# Patient Record
Sex: MALE | Race: WHITE | NOT HISPANIC OR LATINO | Employment: UNEMPLOYED | URBAN - METROPOLITAN AREA
[De-identification: names, ages, dates, MRNs, and addresses within clinical notes are randomized per-mention and may not be internally consistent; named-entity substitution may affect disease eponyms.]

---

## 2018-09-13 ENCOUNTER — OFFICE VISIT - HEALTHEAST (OUTPATIENT)
Dept: FAMILY MEDICINE | Facility: CLINIC | Age: 43
End: 2018-09-13

## 2018-09-13 ENCOUNTER — COMMUNICATION - HEALTHEAST (OUTPATIENT)
Dept: TELEHEALTH | Facility: CLINIC | Age: 43
End: 2018-09-13

## 2018-09-13 DIAGNOSIS — R10.9 FLANK PAIN: ICD-10-CM

## 2018-09-13 LAB
ALBUMIN UR-MCNC: NEGATIVE MG/DL
APPEARANCE UR: CLEAR
BACTERIA #/AREA URNS HPF: ABNORMAL HPF
BILIRUB UR QL STRIP: NEGATIVE
COLOR UR AUTO: YELLOW
GLUCOSE UR STRIP-MCNC: NEGATIVE MG/DL
HGB UR QL STRIP: NEGATIVE
KETONES UR STRIP-MCNC: NEGATIVE MG/DL
LEUKOCYTE ESTERASE UR QL STRIP: NEGATIVE
NITRATE UR QL: NEGATIVE
PH UR STRIP: 6 [PH] (ref 5–8)
RBC #/AREA URNS AUTO: ABNORMAL HPF
SP GR UR STRIP: >=1.03 (ref 1–1.03)
SPERM #/AREA URNS HPF: PRESENT /[HPF]
SQUAMOUS #/AREA URNS AUTO: ABNORMAL LPF
UROBILINOGEN UR STRIP-ACNC: ABNORMAL
WBC #/AREA URNS AUTO: ABNORMAL HPF

## 2019-08-23 ENCOUNTER — OFFICE VISIT - HEALTHEAST (OUTPATIENT)
Dept: CARDIOLOGY | Facility: CLINIC | Age: 44
End: 2019-08-23

## 2019-08-23 DIAGNOSIS — R00.0 TACHYCARDIA: ICD-10-CM

## 2019-08-23 DIAGNOSIS — R07.9 NONSPECIFIC CHEST PAIN: ICD-10-CM

## 2019-08-23 ASSESSMENT — MIFFLIN-ST. JEOR: SCORE: 1874.77

## 2019-08-27 ENCOUNTER — HOSPITAL ENCOUNTER (OUTPATIENT)
Dept: CARDIOLOGY | Facility: HOSPITAL | Age: 44
Discharge: HOME | End: 2019-08-27
Attending: INTERNAL MEDICINE

## 2019-08-27 DIAGNOSIS — R00.0 TACHYCARDIA: ICD-10-CM

## 2019-08-27 LAB
AORTIC ROOT: 3.6 CM
AORTIC VALVE MEAN VELOCITY: 89.1 CM/S
AV CUSP SEPERATION: 2.6 CM
AV CUSP SEPERATION: 2.6 CM
AV DIMENSIONLESS INDEX VTI: 0.7
AV MEAN GRADIENT: 3 MMHG
AV PEAK GRADIENT: 5.5 MMHG
AV VALVE AREA: 2.8 CM2
AV VELOCITY RATIO: 0.9
BSA FOR ECHO PROCEDURE: 2.21 M2
CV ECHO HEIGHT: 71 IN
CV ECHO WEIGHT: 216 LBS
CV STRESS CURRENT BP HE: NORMAL
CV STRESS CURRENT HR HE: 100
CV STRESS CURRENT HR HE: 103
CV STRESS CURRENT HR HE: 104
CV STRESS CURRENT HR HE: 104
CV STRESS CURRENT HR HE: 110
CV STRESS CURRENT HR HE: 114
CV STRESS CURRENT HR HE: 119
CV STRESS CURRENT HR HE: 119
CV STRESS CURRENT HR HE: 121
CV STRESS CURRENT HR HE: 129
CV STRESS CURRENT HR HE: 131
CV STRESS CURRENT HR HE: 139
CV STRESS CURRENT HR HE: 143
CV STRESS CURRENT HR HE: 146
CV STRESS CURRENT HR HE: 150
CV STRESS CURRENT HR HE: 150
CV STRESS CURRENT HR HE: 163
CV STRESS CURRENT HR HE: 164
CV STRESS CURRENT HR HE: 165
CV STRESS CURRENT HR HE: 59
CV STRESS CURRENT HR HE: 66
CV STRESS CURRENT HR HE: 88
CV STRESS CURRENT HR HE: 90
CV STRESS CURRENT HR HE: 92
CV STRESS CURRENT HR HE: 92
CV STRESS CURRENT HR HE: 95
CV STRESS CURRENT HR HE: 99
CV STRESS CURRENT HR HE: 99
CV STRESS DEVIATION TIME HE: NORMAL
CV STRESS ECHO PERCENT HR HE: NORMAL
CV STRESS EXERCISE STAGE HE: NORMAL
CV STRESS FINAL RESTING BP HE: NORMAL
CV STRESS FINAL RESTING HR HE: 90
CV STRESS MAX HR HE: 165
CV STRESS MAX TREADMILL GRADE HE: 14
CV STRESS MAX TREADMILL SPEED HE: 3.4
CV STRESS PEAK DIA BP HE: NORMAL
CV STRESS PEAK SYS BP HE: NORMAL
CV STRESS PHASE HE: NORMAL
CV STRESS PROTOCOL HE: NORMAL
CV STRESS RESTING PT POSITION HE: NORMAL
CV STRESS RESTING PT POSITION HE: NORMAL
CV STRESS ST DEVIATION AMOUNT HE: NORMAL
CV STRESS ST DEVIATION ELEVATION HE: NORMAL
CV STRESS ST EVELATION AMOUNT HE: NORMAL
CV STRESS TEST TYPE HE: NORMAL
CV STRESS TOTAL STAGE TIME MIN 1 HE: NORMAL
DOP CALC AO PEAK VEL: 117 CM/S
DOP CALC AO VTI: 27.2 CM
DOP CALC LVOT AREA: 4.15 CM2
DOP CALC LVOT DIAMETER: 2.3 CM
DOP CALC LVOT PEAK VEL: 101 CM/S
DOP CALC LVOT STROKE VOLUME: 77.2 CM3
DOP CALC MV VTI: 25.5 CM
DOP CALCLVOT PEAK VEL VTI: 18.6 CM
ECHO EJECTION FRACTION ESTIMATED: 65 %
EJECTION FRACTION: 62 % (ref 55–75)
FRACTIONAL SHORTENING: 28.5 % (ref 28–44)
INTERVENTRICULAR SEPTUM IN END DIASTOLE: 1.1 CM (ref 0.6–1)
IVS/PW RATIO: 1.1
LA AREA 1: 18.5 CM2
LA AREA 2: 22 CM2
LEFT ATRIUM LENGTH: 4.9 CM
LEFT ATRIUM SIZE: 3.8 CM
LEFT ATRIUM VOLUME INDEX: 31.9 ML/M2
LEFT ATRIUM VOLUME: 70.6 ML
LEFT VENTRICLE CARDIAC INDEX: 1.9 L/MIN/M2
LEFT VENTRICLE CARDIAC OUTPUT: 4.1 L/MIN
LEFT VENTRICLE DIASTOLIC VOLUME INDEX: 48 CM3/M2 (ref 34–74)
LEFT VENTRICLE DIASTOLIC VOLUME: 106 CM3 (ref 62–150)
LEFT VENTRICLE HEART RATE: 53 BPM
LEFT VENTRICLE MASS INDEX: 101 G/M2
LEFT VENTRICLE SYSTOLIC VOLUME INDEX: 18.5 CM3/M2 (ref 11–31)
LEFT VENTRICLE SYSTOLIC VOLUME: 40.8 CM3 (ref 21–61)
LEFT VENTRICULAR INTERNAL DIMENSION IN DIASTOLE: 5.44 CM (ref 4.2–5.8)
LEFT VENTRICULAR INTERNAL DIMENSION IN SYSTOLE: 3.89 CM (ref 2.5–4)
LEFT VENTRICULAR MASS: 223.3 G
LEFT VENTRICULAR OUTFLOW TRACT MEAN GRADIENT: 2 MMHG
LEFT VENTRICULAR OUTFLOW TRACT MEAN VELOCITY: 66.5 CM/S
LEFT VENTRICULAR OUTFLOW TRACT PEAK GRADIENT: 4 MMHG
LEFT VENTRICULAR POSTERIOR WALL IN END DIASTOLE: 1 CM (ref 0.6–1)
LV STROKE VOLUME INDEX: 34.9 ML/M2
MITRAL VALVE DECELERATION SLOPE: 4160 MM/S2
MITRAL VALVE E/A RATIO: 1.1
MITRAL VALVE MEAN INFLOW VELOCITY: 35.5 CM/S
MITRAL VALVE PEAK VELOCITY: 88.7 CM/S
MITRAL VALVE PRESSURE HALF-TIME: 59 MS
MV AREA VTI: 3.03 CM2
MV AVERAGE E/E' RATIO: 8.2 CM/S
MV DECELERATION TIME: 173 MS
MV E'TISSUE VEL-LAT: 12.9 CM/S
MV E'TISSUE VEL-MED: 6.31 CM/S
MV LATERAL E/E' RATIO: 6.1
MV MEAN GRADIENT: 1 MMHG
MV MEDIAL E/E' RATIO: 12.5
MV PEAK A VELOCITY: 69.4 CM/S
MV PEAK E VELOCITY: 79.1 CM/S
MV PEAK GRADIENT: 3.1 MMHG
MV VALVE AREA BY CONTINUITY EQUATION: 3 CM2
MV VALVE AREA PRESSURE 1/2 METHOD: 3.7 CM2
NUC REST DIASTOLIC VOLUME INDEX: 3456 LBS
NUC REST SYSTOLIC VOLUME INDEX: 71 IN
STRESS ECHO BASELINE BP: NORMAL
STRESS ECHO BASELINE HR: 59
STRESS ECHO CALCULATED PERCENT HR: 94 %
STRESS ECHO LAST STRESS BP: NORMAL
STRESS ECHO LAST STRESS HR: 165
STRESS ECHO POST ESTIMATED WORKLOAD: 10.1
STRESS ECHO POST EXERCISE DUR MIN: 8
STRESS ECHO POST EXERCISE DUR SEC: 29
STRESS ECHO TARGET HR: 149
TRICUSPID VALVE ANULAR PLANE SYSTOLIC EXCURSION: 2.8 CM

## 2019-08-27 ASSESSMENT — MIFFLIN-ST. JEOR: SCORE: 1876.9

## 2019-08-28 ENCOUNTER — AMBULATORY - HEALTHEAST (OUTPATIENT)
Dept: LAB | Facility: HOSPITAL | Age: 44
End: 2019-08-28

## 2019-08-28 DIAGNOSIS — R07.9 NONSPECIFIC CHEST PAIN: ICD-10-CM

## 2019-08-28 LAB
CHOLEST SERPL-MCNC: 147 MG/DL
FASTING STATUS PATIENT QL REPORTED: YES
HDLC SERPL-MCNC: 43 MG/DL
LDLC SERPL CALC-MCNC: 81 MG/DL
TRIGL SERPL-MCNC: 113 MG/DL

## 2019-08-30 ENCOUNTER — AMBULATORY - HEALTHEAST (OUTPATIENT)
Dept: CARDIOLOGY | Facility: CLINIC | Age: 44
End: 2019-08-30

## 2019-09-04 ENCOUNTER — SURGERY - HEALTHEAST (OUTPATIENT)
Dept: CARDIOLOGY | Facility: CLINIC | Age: 44
End: 2019-09-04

## 2019-09-04 ENCOUNTER — AMBULATORY - HEALTHEAST (OUTPATIENT)
Dept: CARDIOLOGY | Facility: CLINIC | Age: 44
End: 2019-09-04

## 2019-09-04 DIAGNOSIS — R00.0 TACHYCARDIA: ICD-10-CM

## 2021-05-31 NOTE — PATIENT INSTRUCTIONS - HE
Elgin,    It was a pleasure to see you today at the [facility].     My nurse or I will call you with the echocardiogram, stress test, and cholesterol profile results.    We will schedule an outpatient loop recorder implantation.    Please call us if you have any questions or concerns about your heart.      Tyson Shah M.D.

## 2021-05-31 NOTE — PROGRESS NOTES
Dr. Cardenas,    Dr. Shah saw this patient in clinic on 8-23-19 for tachycardia and chest pain. Pt with no cardiac history.  Normal Stress ECHO and normal Stress test. No cardiac medications.  Due to infrequent nature of his symptoms and the physical work he does, Dr. Shah would like the patient to have a Loop Recorder implanted.  Can you please let me know how you would like to proceed?  ThanksJojo      From: Kelly Rob RN   Sent: 8/28/2019  10:19 AM   To: Ricco Cui Rn Support Pool   Subject: Loop recorder referral                           Per [facility] - He is fine for the loop recorder implant.  Thanks  mg

## 2021-06-01 NOTE — PROGRESS NOTES
1975  Home:[phone] (home) Cell:[phone] (mobile)  Emergency Contact: Keiry Teixeira [phone]    +++Important patient information for [facility] : None+++    [facility] EP Cath Lab Procedure Order     Device Implant/Revision:  Procedure: New Implant  Device Type: Single Loop  Device Company/Device Rep Needed for Procedure: [employer]    Diagnosis:  Tachycardia  Anticipated Case Duration:  Standard  Scheduling Needs/Timeframe:  Next Available  Anesthesia:  None  Research Protocol:  No    [facility] EP Cath Lab Prep   Ordering Provider: Dr Cardenas  Ordering Date: 9/4/2019  Orders Status: Intial order placed and Order set placed    H&P:  Compled by Dr. Shah  on 8-23-19 if scheduled within 30 days, pt to schedule with PMD if procedure outside of this timeframe  PCP: Provider, No Primary Care, None    Pre-op Labs: N/A for procedure    Medical Records Pertinent for Procedure:  Stress ECHO 8-27-19 ef 65%, Echo 8-27-19 and EKG 7-28-19 SR @61    Patient Education:    Teach with Patient: Will be completed via phone prior to procedure, and letter was also sent to pt via mail/[employer] with written pre-procedural instructions.    Risks Reviewed:     Pacemaker Insertion    <1% for each of the following:  infection, bleeding, hematoma, pneumothorax, subclavian vein thrombosis, cardiac perforation, cardiac tamponade, arrhythmias, pectoral or diaphragmatic stimulation, air embolus, pocket erosion, device interactions.    <4% lead dislodgment, <1% lead fracture or generator  malfunction.    <0.5% CVA or MI.    <0.1% death.    If external defibrillation or CV is needed, 25% risk for superficial burn.    For patients on anticoagulation, the risk of bleeding, hematoma and tamponade are increased.      Consent: Will be obtained in [facility] day of procedure    Pre-Procedure Instructions that were Reviewed with Patient:  NPO after midnight, Remove all jewelry prior to coming in for procedure, Shower prior to arrival, Notified patient  of time and date of procedure by CV , Transportation arrangements needed s/p procedure, Post-procedure follow up process, Sedation plan/orders and Pre-procedure letter was sent to pt by CV     Pre-Procedure Medication Instructions:  Instructions given to pt regarding anticoagulants: Pt is not on an anticoagulant- N/A  Instructions given to pt regarding antiarrhythmic medication: None; N/A  Instructions for medication, other than anticoagulants/antiarrhythmics listed above, given to pt: to take all morning medications with small sips of water, with the exception of OTC supplements and MVI      No Known Allergies  No current outpatient medications on file.    Documentation Date:9/4/2019 12:14 PM  Jojo Aaron RN

## 2021-06-02 VITALS — WEIGHT: 236 LBS

## 2021-06-03 VITALS — WEIGHT: 216 LBS | HEIGHT: 71 IN | BODY MASS INDEX: 30.24 KG/M2

## 2021-06-03 VITALS — WEIGHT: 216 LBS | BODY MASS INDEX: 30.24 KG/M2 | HEIGHT: 71 IN

## 2021-06-16 PROBLEM — R00.0 TACHYCARDIA: Status: ACTIVE | Noted: 2019-08-26

## 2021-06-20 NOTE — PROGRESS NOTES
Impression:  Left-sided flank pain probably musculoskeletal    Plan:  Proximal and, Tylenol, heat, rest, follow-up with spine center if not improved      Chief Complaint:  Chief Complaint   Patient presents with     poss lower back pain     x4days Back pain, upper back pain, Pt cant bend over          HPI:   Elgin Breen III is a 42 y.o. male who presents to this clinic for the evaluation of left sided flank pain.  Patient had gradual onset of left-sided flank pain yesterday.  Today the pain became severe.  It is constant, worse with movement.  He tried some heat without relief.  The pain radiates down into his left buttock does not radiate onto the right side or anywhere else.  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  No numbness or weakness in the legs no incontinence of urine or stool no other painful areas and no other complaints      PMH:   No past medical history on file.  No past surgical history on file.      ROS:    All other systems negative    Meds:  No current outpatient prescriptions on file.  No current facility-administered medications for this visit.         Social:  Social History     Social History     Marital status: Single     Spouse name: N/A     Number of children: N/A     Years of education: N/A     Occupational History     Not on file.     Social History Main Topics     Smoking status: Never Smoker     Smokeless tobacco: Never Used     Alcohol use Not on file     Drug use: Not on file     Sexual activity: Not on file     Other Topics Concern     Not on file     Social History Narrative     No narrative on file         Physical Exam:  Peers uncomfortable  Vital signs reviewed  Eyes: PERRL, EOMI  Head: Atraumatic and normocephalic  Abdomen: Nontender without mass, there is some left CVA tenderness  Extremities: No tenderness or deformity  Skin: No lesions or rash  Neuro: Normal motor and sensory function in all  extremities  Psych: Awake, alert, normally responsive      Results:    Recent Results (from the past 24 hour(s))   Urinalysis-UC if Indicated   Result Value Ref Range    Color, UA Yellow Colorless, Yellow, Straw, Light Yellow    Clarity, UA Clear Clear    Glucose, UA Negative Negative    Bilirubin, UA Negative Negative    Ketones, UA Negative Negative    Specific Gravity, UA >=1.030 1.005 - 1.030    Blood, UA Negative Negative    pH, UA 6.0 5.0 - 8.0    Protein, UA Negative Negative mg/dL    Urobilinogen, UA 0.2 E.U./dL 0.2 E.U./dL, 1.0 E.U./dL    Nitrite, UA Negative Negative    Leukocytes, UA Negative Negative       No results found.      Murtaza Acevedo MD

## 2021-06-27 NOTE — PROGRESS NOTES
Progress Notes by Fredy Shah MD at 8/23/2019 10:30 AM     Author: Fredy Shah MD Service: -- Author Type: Physician    Filed: 8/23/2019  1:13 PM Encounter Date: 8/23/2019 Status: Signed    : Fredy Shah MD (Physician)           Click to link to [facility] Consultation Note    Thank you, Dr. TIM Morales, for advising Elgin Breen SALVADOR to meet with me in consultation today at the [facility] to evaluate chest discomfort.     Assessment:    1. Nonspecific chest pain  Echo Stress Exercise    Lipid Profile   2. Tachycardia  Echo Complete    Case Request EP: EP Loop Recorder Insertion       Plan:    1. We will call Elgin with the results of the lipid profile and exercise and resting echocardiographic testing.  2. We discussed 30-day patient activated monitor versus implanted loop recorder; given the infrequent nature of his symptoms and the fact that he wears tool belts and perspires heavily at work Elgin felt that an implanted loop recorder would be more effective and practical for him.  A case request was placed in our staff will work with the electrophysiology nurses on education and scheduling.    An After Visit Summary was printed and given to the patient.    Current History:    Elgin tells me that he has had several very disturbing episodes since this spring.  His first episode was in April when he developed sudden chest pain, tachycardia, diaphoresis and shortness of breath.  He states he called the paramedics to his home and they checked him but did not take him to the hospital.     His second episode was on May 25 when he again had a sudden electric shock type chest pain with associated rapid heartbeat breathlessness and weakness.  He felt like he might pass out, but he did not.  He was taken to [facility] ER and evaluated by Dr. Thao and released.     The third episode was on July 28 and started with a sudden sharp pain in his  "chest while sitting on the couch; he again felt lightheaded, had rapid heart pounding, and weakness.  His girlfriend drove him to the [facility] ER.  He was evaluated by Dr. Raj Morales and released.    There is no problem list on file for this patient.      Past Medical History:  History reviewed. No pertinent past medical history.    Past Surgical History:  Past Surgical History:   Procedure Laterality Date   ? INGUINAL HERNIA REPAIR Right 1980   ? MULTIPLE TOOTH EXTRACTIONS         Family History:  Family History   Problem Relation Age of Onset   ? No Medical Problems Son    ? Coronary Stenting Father 68        smoker   ? Lung cancer Mother 52        smoker   ? No Medical Problems Sister    ? No Medical Problems Brother    ? No Medical Problems Sister    ? No Medical Problems Sister        Social History:   reports that he has never smoked. His smokeless tobacco use includes chew. He reports that he drank alcohol. He reports that he does not use drugs.    Medications:  No outpatient encounter medications on file as of 8/23/2019.     No facility-administered encounter medications on file as of 8/23/2019.        Allergies:  Patient has no known allergies.    Review of Systems:     General: WNL  Eyes: WNL  Ears/Nose/Throat: WNL  Lungs: Cough, Shortness of Breath  Heart: Chest Pain, Irregular Heartbeat(Pt states he is having some chest discomfort at this time. ;0.5 - 1 /10.)  Stomach: Heartburn, Nausea  Bladder: WNL  Muscle/Joints: WNL  Skin: WNL  Nervous System: Daytime Sleepiness, Dizziness  Mental Health: Confusion     Blood: WNL    Objective:     Physical Exam:  Wt Readings from Last 5 Encounters:   08/23/19 216 lb (98 kg)   07/28/19 (!) 230 lb (104.3 kg)   05/25/19 (!) 227 lb (103 kg)   09/13/18 (!) 236 lb (107 kg)      5' 10.87\" (1.8 m)  Body mass index is 30.24 kg/m .  /66 (Patient Site: Right Arm, Patient Position: Sitting, Cuff Size: Adult Regular)   Pulse 64   Resp 16   Ht 5' 10.87\" (1.8 m)   Wt " 216 lb (98 kg)   BMI 30.24 kg/m      General Appearance: Alert and not in distress   HEENT: No scleral icterus; the tongue is midline and the mucous membranes are pink and moist   Neck: No cervical bruits, adenopathy, or thyromegaly; jugular venous pressure is less than 5 cm   Chest: The spine is straight and the chest is symmetric   Lungs: Respirations are unlabored; the lungs are clear to auscultation   Cardiovasular: Auscultation reveals normal first and second heart sounds and no murmurs, rubs, or gallops; the carotid, radial, femoral, and posterior tibial pulses are intact   Abdomen: No organomegaly, masses, bruits, or tenderness; bowel sounds are present   Extremities: No cyanosis, clubbing or edema   Skin: No xanthelasma   Neurologic: Mood and affect are appropriate       Cardiac testing:    EKG: Sinus rhythm, normal EKG's per my personal review of the 2 EKGs obtained during his 2 emergency room visits.    Imaging:    Xr Chest 2 Views    Result Date: 7/28/2019  EXAM: XR CHEST 2 VIEWS LOCATION: [facility] DATE/TIME: 7/28/2019 7:28 PM INDICATION: chest pain COMPARISON: Chest x-ray 05/25/2019 FINDINGS: Negative chest.      Lab Review:    Lab Results   Component Value Date     07/28/2019    K 3.8 07/28/2019     07/28/2019    CO2 23 07/28/2019    BUN 11 07/28/2019    CREATININE 0.89 07/28/2019    CALCIUM 9.6 07/28/2019     Lab Results   Component Value Date    WBC 9.1 07/28/2019    HGB 15.6 07/28/2019    HCT 45.4 07/28/2019    MCV 86 07/28/2019     07/28/2019     Creatinine (mg/dL)   Date Value   07/28/2019 0.89   05/25/2019 0.88   12/19/2011 0.89           Much or all of the text in this note was generated through the use of the Dragon Dictate voice-to-text software. Errors in spelling or words which seem out of context are unintentional. Sound alike errors, in particular, may have escaped editing.

## 2021-06-27 NOTE — PROGRESS NOTES
Progress Notes by Jojo Aaron RN at 8/30/2019  4:56 PM     Author: Jojo Aaron RN Service: -- Author Type: Registered Nurse    Filed: 9/4/2019  1:12 PM Encounter Date: 8/30/2019 Status: Signed    : Jojo Aaron RN (Registered Nurse)       Santi Cardenas MD Johnson, Caroline, RN             Ok to proceed. Thank you.

## 2021-07-03 NOTE — ADDENDUM NOTE
Addendum Note by Mike Acevedo MD at 9/13/2018  5:11 PM     Author: Mike Acevedo MD Service: -- Author Type: Physician    Filed: 9/13/2018  5:11 PM Encounter Date: 9/13/2018 Status: Signed    : Mike Acevedo MD (Physician)    Addended by: MIKE ACEVEDO on: 9/13/2018 05:11 PM        Modules accepted: Orders

## 2021-12-27 ENCOUNTER — OFFICE VISIT (OUTPATIENT)
Dept: FAMILY MEDICINE | Facility: CLINIC | Age: 46
End: 2021-12-27
Payer: MEDICAID

## 2021-12-27 VITALS
SYSTOLIC BLOOD PRESSURE: 142 MMHG | TEMPERATURE: 99.3 F | OXYGEN SATURATION: 99 % | BODY MASS INDEX: 32.45 KG/M2 | HEART RATE: 65 BPM | HEIGHT: 69 IN | DIASTOLIC BLOOD PRESSURE: 92 MMHG | WEIGHT: 219.1 LBS

## 2021-12-27 DIAGNOSIS — Z12.5 SCREENING FOR PROSTATE CANCER: ICD-10-CM

## 2021-12-27 DIAGNOSIS — F32.1 CURRENT MODERATE EPISODE OF MAJOR DEPRESSIVE DISORDER WITHOUT PRIOR EPISODE (H): Primary | ICD-10-CM

## 2021-12-27 DIAGNOSIS — Z11.59 NEED FOR HEPATITIS C SCREENING TEST: ICD-10-CM

## 2021-12-27 DIAGNOSIS — N20.0 NEPHROLITHIASIS: ICD-10-CM

## 2021-12-27 DIAGNOSIS — Z23 HIGH PRIORITY FOR 2019-NCOV VACCINE: ICD-10-CM

## 2021-12-27 DIAGNOSIS — Z00.00 ANNUAL PHYSICAL EXAM: ICD-10-CM

## 2021-12-27 DIAGNOSIS — Z11.4 SCREENING FOR HIV (HUMAN IMMUNODEFICIENCY VIRUS): ICD-10-CM

## 2021-12-27 PROBLEM — F43.25 ADJUSTMENT DISORDER WITH MIXED DISTURBANCE OF EMOTIONS AND CONDUCT: Status: ACTIVE | Noted: 2021-11-10

## 2021-12-27 PROBLEM — R00.0 TACHYCARDIA: Status: RESOLVED | Noted: 2019-08-26 | Resolved: 2021-12-27

## 2021-12-27 PROBLEM — F43.25 ADJUSTMENT DISORDER WITH MIXED DISTURBANCE OF EMOTIONS AND CONDUCT: Status: RESOLVED | Noted: 2021-11-10 | Resolved: 2021-12-27

## 2021-12-27 LAB
ALBUMIN SERPL-MCNC: 4.6 G/DL (ref 3.5–5)
ALP SERPL-CCNC: 77 U/L (ref 45–120)
ALT SERPL W P-5'-P-CCNC: 35 U/L (ref 0–45)
ANION GAP SERPL CALCULATED.3IONS-SCNC: 10 MMOL/L (ref 5–18)
AST SERPL W P-5'-P-CCNC: 25 U/L (ref 0–40)
BILIRUB SERPL-MCNC: 0.8 MG/DL (ref 0–1)
BUN SERPL-MCNC: 10 MG/DL (ref 8–22)
CALCIUM SERPL-MCNC: 10.2 MG/DL (ref 8.5–10.5)
CHLORIDE BLD-SCNC: 104 MMOL/L (ref 98–107)
CHOLEST SERPL-MCNC: 168 MG/DL
CO2 SERPL-SCNC: 27 MMOL/L (ref 22–31)
CREAT SERPL-MCNC: 0.99 MG/DL (ref 0.7–1.3)
ERYTHROCYTE [DISTWIDTH] IN BLOOD BY AUTOMATED COUNT: 13 % (ref 10–15)
FASTING STATUS PATIENT QL REPORTED: NO
GFR SERPL CREATININE-BSD FRML MDRD: >90 ML/MIN/1.73M2
GLUCOSE BLD-MCNC: 89 MG/DL (ref 70–125)
HCT VFR BLD AUTO: 44.8 % (ref 40–53)
HDLC SERPL-MCNC: 49 MG/DL
HGB BLD-MCNC: 15.3 G/DL (ref 13.3–17.7)
HIV 1+2 AB+HIV1 P24 AG SERPL QL IA: NEGATIVE
LDLC SERPL CALC-MCNC: 98 MG/DL
MCH RBC QN AUTO: 29.4 PG (ref 26.5–33)
MCHC RBC AUTO-ENTMCNC: 34.2 G/DL (ref 31.5–36.5)
MCV RBC AUTO: 86 FL (ref 78–100)
PLATELET # BLD AUTO: 305 10E3/UL (ref 150–450)
POTASSIUM BLD-SCNC: 4.4 MMOL/L (ref 3.5–5)
PROT SERPL-MCNC: 7.5 G/DL (ref 6–8)
PSA SERPL-MCNC: 0.55 UG/L (ref 0–2.5)
RBC # BLD AUTO: 5.2 10E6/UL (ref 4.4–5.9)
SODIUM SERPL-SCNC: 141 MMOL/L (ref 136–145)
TRIGL SERPL-MCNC: 107 MG/DL
WBC # BLD AUTO: 7.8 10E3/UL (ref 4–11)

## 2021-12-27 PROCEDURE — 0001A COVID-19,PF,PFIZER (12+ YRS): CPT | Performed by: STUDENT IN AN ORGANIZED HEALTH CARE EDUCATION/TRAINING PROGRAM

## 2021-12-27 PROCEDURE — 85027 COMPLETE CBC AUTOMATED: CPT | Performed by: STUDENT IN AN ORGANIZED HEALTH CARE EDUCATION/TRAINING PROGRAM

## 2021-12-27 PROCEDURE — 86803 HEPATITIS C AB TEST: CPT | Performed by: STUDENT IN AN ORGANIZED HEALTH CARE EDUCATION/TRAINING PROGRAM

## 2021-12-27 PROCEDURE — 96127 BRIEF EMOTIONAL/BEHAV ASSMT: CPT | Performed by: STUDENT IN AN ORGANIZED HEALTH CARE EDUCATION/TRAINING PROGRAM

## 2021-12-27 PROCEDURE — 87389 HIV-1 AG W/HIV-1&-2 AB AG IA: CPT | Performed by: STUDENT IN AN ORGANIZED HEALTH CARE EDUCATION/TRAINING PROGRAM

## 2021-12-27 PROCEDURE — 91300 COVID-19,PF,PFIZER (12+ YRS): CPT | Performed by: STUDENT IN AN ORGANIZED HEALTH CARE EDUCATION/TRAINING PROGRAM

## 2021-12-27 PROCEDURE — 90686 IIV4 VACC NO PRSV 0.5 ML IM: CPT | Performed by: STUDENT IN AN ORGANIZED HEALTH CARE EDUCATION/TRAINING PROGRAM

## 2021-12-27 PROCEDURE — 80061 LIPID PANEL: CPT | Performed by: STUDENT IN AN ORGANIZED HEALTH CARE EDUCATION/TRAINING PROGRAM

## 2021-12-27 PROCEDURE — 99214 OFFICE O/P EST MOD 30 MIN: CPT | Mod: 25 | Performed by: STUDENT IN AN ORGANIZED HEALTH CARE EDUCATION/TRAINING PROGRAM

## 2021-12-27 PROCEDURE — 99386 PREV VISIT NEW AGE 40-64: CPT | Mod: 25 | Performed by: STUDENT IN AN ORGANIZED HEALTH CARE EDUCATION/TRAINING PROGRAM

## 2021-12-27 PROCEDURE — 80053 COMPREHEN METABOLIC PANEL: CPT | Performed by: STUDENT IN AN ORGANIZED HEALTH CARE EDUCATION/TRAINING PROGRAM

## 2021-12-27 PROCEDURE — 90471 IMMUNIZATION ADMIN: CPT | Performed by: STUDENT IN AN ORGANIZED HEALTH CARE EDUCATION/TRAINING PROGRAM

## 2021-12-27 PROCEDURE — G0103 PSA SCREENING: HCPCS | Performed by: STUDENT IN AN ORGANIZED HEALTH CARE EDUCATION/TRAINING PROGRAM

## 2021-12-27 PROCEDURE — 36415 COLL VENOUS BLD VENIPUNCTURE: CPT | Performed by: STUDENT IN AN ORGANIZED HEALTH CARE EDUCATION/TRAINING PROGRAM

## 2021-12-27 RX ORDER — CITALOPRAM HYDROBROMIDE 10 MG/1
10 TABLET ORAL DAILY
Qty: 30 TABLET | Refills: 1 | Status: SHIPPED | OUTPATIENT
Start: 2021-12-27 | End: 2022-01-28

## 2021-12-27 ASSESSMENT — ANXIETY QUESTIONNAIRES
7. FEELING AFRAID AS IF SOMETHING AWFUL MIGHT HAPPEN: NOT AT ALL
6. BECOMING EASILY ANNOYED OR IRRITABLE: NOT AT ALL
5. BEING SO RESTLESS THAT IT IS HARD TO SIT STILL: NOT AT ALL
3. WORRYING TOO MUCH ABOUT DIFFERENT THINGS: NOT AT ALL
4. TROUBLE RELAXING: NOT AT ALL
IF YOU CHECKED OFF ANY PROBLEMS ON THIS QUESTIONNAIRE, HOW DIFFICULT HAVE THESE PROBLEMS MADE IT FOR YOU TO DO YOUR WORK, TAKE CARE OF THINGS AT HOME, OR GET ALONG WITH OTHER PEOPLE: NOT DIFFICULT AT ALL
2. NOT BEING ABLE TO STOP OR CONTROL WORRYING: NOT AT ALL
GAD7 TOTAL SCORE: 0
1. FEELING NERVOUS, ANXIOUS, OR ON EDGE: NOT AT ALL

## 2021-12-27 ASSESSMENT — MIFFLIN-ST. JEOR: SCORE: 1854.21

## 2021-12-27 ASSESSMENT — PATIENT HEALTH QUESTIONNAIRE - PHQ9: SUM OF ALL RESPONSES TO PHQ QUESTIONS 1-9: 1

## 2021-12-27 NOTE — PROGRESS NOTES
Assessment/ Plan     46-year-old male who presents for establishment of care and discussed recent hospitalization secondary to suicidal ideation and attempt.    1. Screening for HIV (human immunodeficiency virus)  - HIV Antigen Antibody Combo; Future    2. Need for hepatitis C screening test  - Hepatitis C Screen Reflex to HCV RNA Quant and Genotype; Future    3. Current moderate episode of major depressive disorder without prior episode (H)  Patient recently hospitalized in November 2021 for suicidal ideation and attempt.  Diagnosed during this admission with adjustment disorder related to him losing his job early in pandemic.  Given time course I suspect patient now is suffering from depression I recommended treatment with antidepressant and referral for therapy.  He is not having any current suicidal ideation.  We will see him again in a month to follow-up on this.  - Adult Mental Health Referral; Future  - citalopram (CELEXA) 10 MG tablet; Take 1 tablet (10 mg) by mouth daily  Dispense: 30 tablet; Refill: 1    4. Nephrolithiasis  Multiple stones in the past that he usually goes to the ER for for pain control    5. High priority for 2019-nCoV vaccine  - COVID-19,PF,PFIZER (12+ Yrs PURPLE LABEL)    6. Annual physical exam  - REVIEW OF HEALTH MAINTENANCE PROTOCOL ORDERS  - Comprehensive metabolic panel (BMP + Alb, Alk Phos, ALT, AST, Total. Bili, TP); Future  - CBC with platelets; Future  - Lipid panel reflex to direct LDL Non-fasting; Future    7. Screening for prostate cancer  - PSA, screen; Future    Follow-up in: 1 month for depression and review of Hx, recheck BP    Godfrey Tapia MD    Subjective:     Elgin Breen III is a 46 year old female who presents for an annual exam.     Chief Complaint   Patient presents with     Physical     flu shot? wants to talk about covid vaccine.       Follow Up     follow up on mental health referral     Answers for HPI/ROS submitted by the patient on 12/27/2021  Frequency  of exercise:: None  Getting at least 3 servings of Calcium per day:: NO  Diet:: Regular (no restrictions)  Taking medications regularly:: Not Applicable  Medication side effects:: Not applicable  Bi-annual eye exam:: NO  Dental care twice a year:: NO  Sleep apnea or symptoms of sleep apnea:: None  Additional concerns today:: No    Mood:  Patient tells me he has a crises counselor who put in a referral in for mental health provider. He notes that he has been in very stressful events in his life over the last couple of years including losing his job due to the pandemic. He had significant depressive symptoms and contemplating suicide. He was hospitalized for this in early November 2021. He currently is staying with a friend after being hospitalized and has an order of protection against him from his former girlfriend.     PHQ 12/27/2021   PHQ-9 Total Score 1   Q9: Thoughts of better off dead/self-harm past 2 weeks Not at all     SANGITA-7 SCORE 12/27/2021   Total Score 0     Colonoscopy: never  PSA: never    Immunization History   Administered Date(s) Administered     DT (PEDS <7y) 06/24/2005     Tdap (Adacel,Boostrix) 10/17/2012     Immunization status:  due today.     No current outpatient medications on file.     No past medical history on file.  Past Surgical History:   Procedure Laterality Date     INGUINAL HERNIA REPAIR Right 1980     MULTIPLE TOOTH EXTRACTIONS       Patient has no known allergies.  Family History   Problem Relation Age of Onset     No Known Problems Son      Coronary Stenting Father 68.00        smoker     Lung Cancer Mother 52.00        smoker     No Known Problems Sister      No Known Problems Brother      No Known Problems Sister      No Known Problems Sister      Social History     Socioeconomic History     Marital status: Single     Spouse name: Not on file     Number of children: 1     Years of education: Not on file     Highest education level: Not on file   Occupational History     Not on  "file   Tobacco Use     Smoking status: Never Smoker     Smokeless tobacco: Current User     Types: Chew, Chew   Substance and Sexual Activity     Alcohol use: Not Currently     Drug use: Never     Sexual activity: Yes     Partners: Female   Other Topics Concern     Not on file   Social History Narrative    Elgin lives with his female SO, Keiry Teixeira and her son. He walks his four dogs three times a week for 15 minutes.      Social Determinants of Health     Financial Resource Strain: Not on file   Food Insecurity: Not on file   Transportation Needs: Not on file   Physical Activity: Not on file   Stress: Not on file   Social Connections: Not on file   Intimate Partner Violence: Not on file   Housing Stability: Not on file       Review of Systems  Complete ROS negative except as noted in the HPI    Objective:      Vitals:    12/27/21 1557   BP: (!) 142/92   BP Location: Left arm   Patient Position: Sitting   Cuff Size: Adult Regular   Pulse: 65   Temp: 99.3  F (37.4  C)   TempSrc: Temporal   SpO2: 99%   Weight: 99.4 kg (219 lb 1.6 oz)   Height: 1.753 m (5' 9\")       General appearance: Alert, cooperative, no distress, appears stated age  Head: Normocephalic, atraumatic, without obvious abnormality  EARS: TM's gray dull with structures seen bilaterally  Eyes: Pupils equal round, reactive.  Conjunctiva clear.  Nose: Nares normal, no drainage.  Throat: Lips, mucosa, tongue normal mucosa pink and moist  Neck: Supple, symmetric, trachea midline, no adenopathy.  No thyroid enlargement, tenderness or nodules.    Lungs: Clear to auscultation bilaterally, no wheezing or crackles present.  Respirations unlabored  Heart: Regular rate and rhythm, normal S1 and S2, no murmur, rub or gallop.  Abdomen: Soft, nontender, nondistended.  Bowel sounds active in all 4 quadrants.  No masses or organomegaly.  Extremities: Extremities normal, atraumatic.  No cyanosis or edema.  Skin: Skin color, texture, turgor normal no rashes or lesions " on limited skin exam  Neurologic: CN II through XII intact, normal strength.  Psych: Normal-appearing hygiene, speech is normal pace and volume, nontangential, noncircumferential, thoughtprocess is logical, does not appear to responding to internal stimuli, no expression of paranoid delusions, mood is depressed      Godfrey Edwards MD

## 2021-12-28 PROBLEM — Z00.00 HEALTHCARE MAINTENANCE: Status: ACTIVE | Noted: 2021-12-28

## 2021-12-28 LAB — HCV AB SERPL QL IA: NONREACTIVE

## 2021-12-28 ASSESSMENT — ANXIETY QUESTIONNAIRES: GAD7 TOTAL SCORE: 0

## 2021-12-28 NOTE — RESULT ENCOUNTER NOTE
I called the patient but no answer. Left message explaining recent clinic results and next steps. Advised to call clinic or send [employer] message with questions.    Dr. Godfrey Tapia

## 2022-01-11 ENCOUNTER — TRANSFERRED RECORDS (OUTPATIENT)
Dept: HEALTH INFORMATION MANAGEMENT | Facility: CLINIC | Age: 47
End: 2022-01-11
Payer: MEDICAID

## 2022-01-17 ENCOUNTER — IMMUNIZATION (OUTPATIENT)
Dept: NURSING | Facility: CLINIC | Age: 47
End: 2022-01-17
Attending: STUDENT IN AN ORGANIZED HEALTH CARE EDUCATION/TRAINING PROGRAM
Payer: MEDICAID

## 2022-01-17 PROCEDURE — 0002A PR COVID VAC PFIZER DIL RECON 30 MCG/0.3 ML IM: CPT

## 2022-01-17 PROCEDURE — 91300 PR COVID VAC PFIZER DIL RECON 30 MCG/0.3 ML IM: CPT

## 2022-01-28 ENCOUNTER — OFFICE VISIT (OUTPATIENT)
Dept: FAMILY MEDICINE | Facility: CLINIC | Age: 47
End: 2022-01-28
Payer: MEDICAID

## 2022-01-28 VITALS
WEIGHT: 212.9 LBS | DIASTOLIC BLOOD PRESSURE: 74 MMHG | HEART RATE: 68 BPM | OXYGEN SATURATION: 98 % | BODY MASS INDEX: 31.44 KG/M2 | SYSTOLIC BLOOD PRESSURE: 132 MMHG

## 2022-01-28 DIAGNOSIS — F32.1 CURRENT MODERATE EPISODE OF MAJOR DEPRESSIVE DISORDER WITHOUT PRIOR EPISODE (H): ICD-10-CM

## 2022-01-28 DIAGNOSIS — D22.9 ATYPICAL NEVI: Primary | ICD-10-CM

## 2022-01-28 PROCEDURE — 99213 OFFICE O/P EST LOW 20 MIN: CPT | Performed by: STUDENT IN AN ORGANIZED HEALTH CARE EDUCATION/TRAINING PROGRAM

## 2022-01-28 RX ORDER — CITALOPRAM HYDROBROMIDE 10 MG/1
10 TABLET ORAL DAILY
Qty: 90 TABLET | Refills: 1 | Status: SHIPPED | OUTPATIENT
Start: 2022-01-28

## 2022-01-28 RX ORDER — CEPHALEXIN 500 MG/1
500 CAPSULE ORAL 2 TIMES DAILY
COMMUNITY
Start: 2022-01-20

## 2022-01-28 RX ORDER — FLUOCINONIDE TOPICAL SOLUTION USP, 0.05% 0.5 MG/ML
SOLUTION TOPICAL
COMMUNITY
Start: 2022-01-11

## 2022-01-28 ASSESSMENT — ANXIETY QUESTIONNAIRES
1. FEELING NERVOUS, ANXIOUS, OR ON EDGE: NOT AT ALL
3. WORRYING TOO MUCH ABOUT DIFFERENT THINGS: NOT AT ALL
GAD7 TOTAL SCORE: 0
4. TROUBLE RELAXING: NOT AT ALL
5. BEING SO RESTLESS THAT IT IS HARD TO SIT STILL: NOT AT ALL
7. FEELING AFRAID AS IF SOMETHING AWFUL MIGHT HAPPEN: NOT AT ALL
IF YOU CHECKED OFF ANY PROBLEMS ON THIS QUESTIONNAIRE, HOW DIFFICULT HAVE THESE PROBLEMS MADE IT FOR YOU TO DO YOUR WORK, TAKE CARE OF THINGS AT HOME, OR GET ALONG WITH OTHER PEOPLE: NOT DIFFICULT AT ALL
6. BECOMING EASILY ANNOYED OR IRRITABLE: NOT AT ALL
2. NOT BEING ABLE TO STOP OR CONTROL WORRYING: NOT AT ALL

## 2022-01-28 ASSESSMENT — PATIENT HEALTH QUESTIONNAIRE - PHQ9: SUM OF ALL RESPONSES TO PHQ QUESTIONS 1-9: 0

## 2022-01-28 NOTE — PROGRESS NOTES
Assessment and Plan     46-year-old male who presents for follow-up on his major depressive disorder.  Doing well on citalopram 10 mg daily.  Has noticed mild side effect of blunting his mood but otherwise no concerns.  Shared decision making decided continue him on citalopram 10 mg and I recommended 6 to 12-month continuous use of this.  Discussed that he could attempt a drug holiday at that time.  Also check patient's incision from his recent Mohs like procedure for precancerous skin lesion on his back.  appears to be healing well.    1. Current moderate episode of major depressive disorder without prior episode (H)  - citalopram (CELEXA) 10 MG tablet; Take 1 tablet (10 mg) by mouth daily  Dispense: 90 tablet; Refill: 1    2. Atypical nevi    Follow up: PRN  Options for treatment and follow-up care were reviewed with the patient and/or guardian. Elgin Breen III and/or guardian engaged in the decision making process and verbalized understanding of the options discussed and agreed with the final plan.    Dr. Godfrey Tapia         HPI:   Elgin Breen III is a 46 year old  male who presents for:    Chief Complaint   Patient presents with     Depression     follow up       Depression Hx:  Patient recently hospitalized in November 2021 for suicidal ideation and attempt.  Diagnosed during this admission with adjustment disorder related to him losing his job early in pandemic.  I saw him on 12/27/2021 and diagnosed him with major depression and started on citalopram 10 mg daily as well as referred him to therapy.    Today:  Patient feels like his depression is doing well.  Certainly thinks his mood has improved since I last saw him.    PHQ 12/27/2021 1/28/2022   PHQ-9 Total Score 1 0   Q9: Thoughts of better off dead/self-harm past 2 weeks Not at all Not at all     SANGITA-7 SCORE 12/27/2021 1/28/2022   Total Score 0 0       Back:  Patient had concerning lesion on his back that he went to dermatology for and had biopsy  that showed precancerous lesion. Larger MOHS type procedure done. Wanting me to look at wound.          PMHX:     Patient Active Problem List   Diagnosis     Nephrolithiasis     Current moderate episode of major depressive disorder without prior episode (H)     Healthcare maintenance       Current Outpatient Medications   Medication Sig Dispense Refill     citalopram (CELEXA) 10 MG tablet Take 1 tablet (10 mg) by mouth daily 30 tablet 1     cephALEXin (KEFLEX) 500 MG capsule Take 500 mg by mouth 2 times daily       fluocinonide (LIDEX) 0.05 % external solution APPLY TOPICALLY TO THE SCALP TWICE DAILY         Social History     Tobacco Use     Smoking status: Never Smoker     Smokeless tobacco: Current User     Types: Chew, Chew   Substance Use Topics     Alcohol use: Not Currently     Drug use: Never       Social History     Social History Narrative    Elgin lives with his female SO, Keiry Teixeira and her son. He walks his four dogs three times a week for 15 minutes.        No Known Allergies    No results found for this or any previous visit (from the past 24 hour(s)).         Review of Systems:    ROS: 10 point ROS neg other than the symptoms noted above in the HPI.         Physical Exam:     Vitals:    01/28/22 1444   BP: 132/74   BP Location: Right arm   Patient Position: Sitting   Cuff Size: Adult Regular   Pulse: 68   SpO2: 98%   Weight: 96.6 kg (212 lb 14.4 oz)     Body mass index is 31.44 kg/m .    General appearance: Alert, cooperative, no distress, appears stated age  Head: Normocephalic, atraumatic, without obvious abnormality  Eyes: Pupils equal round, reactive.  Conjunctiva clear.  Nose: Nares normal, no drainage.  Throat: Lips, mucosa, tongue normal mucosa pink and moist  Neck: Supple, symmetric, trachea midline  Psych: Normal-appearing hygiene, speech is normal pace and volume, nontangential, noncircumferential, thoughtprocess is logical, does not appear to responding to internal stimuli, no  expression of paranoid delusions, mood is normal

## 2022-01-29 ASSESSMENT — ANXIETY QUESTIONNAIRES: GAD7 TOTAL SCORE: 0

## 2022-03-01 ENCOUNTER — TELEPHONE (OUTPATIENT)
Dept: BEHAVIORAL HEALTH | Facility: CLINIC | Age: 47
End: 2022-03-01

## 2022-03-01 NOTE — TELEPHONE ENCOUNTER
Left a VM to remind pt about their Thursday 3/3/22 video appt at 8am.    Writer mention about how pt. Can connect video appt via My Chart and to finish e-check in question prior to appt time, or if pt. Prefer video visit through a link via text or email.    Writer left intake number for pt. To call to update that info or have any questions.

## 2022-03-14 ENCOUNTER — FCC EXTENDED DOCUMENTATION (OUTPATIENT)
Dept: PSYCHOLOGY | Facility: CLINIC | Age: 47
End: 2022-03-14

## 2022-03-14 NOTE — PROGRESS NOTES
Discharge Summary  Multiple Sessions    Client Name: Elgin Breen III MRN#: [id number] YOB: 1975      Intake / Discharge Date: 3/14/2022      DSM5 Diagnoses: (Sustained by DSM5 Criteria Listed Above)  Diagnoses: 296.22 (F32.1)  Major Depressive Disorder, Single Episode, Moderate _  Psychosocial & Contextual Factors: depression   WHODAS 2.0 (12 item) Score: N/A          Presenting Concern:    In chart, client has recent diagnosis of major depressive disorder.       Reason for Discharge:  client did not show for 3/3 sessions in last 3 weeks       Disposition at Time of Last Encounter:   Comments:   No extra comments at this time     Risk Management:   Client client never seen, no reported history of risk   client was never seen, no reported history of risk, therefore no creation of safety plan developed.       Referred To:  Back to intake to restart process over for a new therapist         Soraya Avila MSW intern  3/14/2022

## 2022-10-02 ENCOUNTER — HEALTH MAINTENANCE LETTER (OUTPATIENT)
Age: 47
End: 2022-10-02

## 2023-02-11 ENCOUNTER — HEALTH MAINTENANCE LETTER (OUTPATIENT)
Age: 48
End: 2023-02-11

## 2024-03-09 ENCOUNTER — HEALTH MAINTENANCE LETTER (OUTPATIENT)
Age: 49
End: 2024-03-09